# Patient Record
Sex: MALE | Race: BLACK OR AFRICAN AMERICAN | Employment: OTHER | URBAN - METROPOLITAN AREA
[De-identification: names, ages, dates, MRNs, and addresses within clinical notes are randomized per-mention and may not be internally consistent; named-entity substitution may affect disease eponyms.]

---

## 2017-05-04 ENCOUNTER — LAB ENCOUNTER (OUTPATIENT)
Dept: LAB | Facility: HOSPITAL | Age: 82
End: 2017-05-04
Attending: INTERNAL MEDICINE
Payer: COMMERCIAL

## 2017-05-04 DIAGNOSIS — R73.09 OTHER ABNORMAL GLUCOSE: ICD-10-CM

## 2017-05-04 DIAGNOSIS — E78.5 HYPERLIPEMIA: ICD-10-CM

## 2017-05-04 DIAGNOSIS — I10 ESSENTIAL HYPERTENSION, MALIGNANT: Primary | ICD-10-CM

## 2017-05-04 PROCEDURE — 80061 LIPID PANEL: CPT

## 2017-05-04 PROCEDURE — 36415 COLL VENOUS BLD VENIPUNCTURE: CPT

## 2017-05-04 PROCEDURE — 80053 COMPREHEN METABOLIC PANEL: CPT

## 2017-05-04 PROCEDURE — 83036 HEMOGLOBIN GLYCOSYLATED A1C: CPT

## 2017-05-04 PROCEDURE — 84443 ASSAY THYROID STIM HORMONE: CPT

## 2017-05-04 PROCEDURE — 85025 COMPLETE CBC W/AUTO DIFF WBC: CPT

## 2017-05-04 PROCEDURE — 84439 ASSAY OF FREE THYROXINE: CPT

## 2017-05-16 ENCOUNTER — APPOINTMENT (OUTPATIENT)
Dept: WOUND CARE | Facility: HOSPITAL | Age: 82
End: 2017-05-16
Attending: NURSE PRACTITIONER
Payer: MEDICARE

## 2017-05-25 ENCOUNTER — OFFICE VISIT (OUTPATIENT)
Dept: WOUND CARE | Facility: HOSPITAL | Age: 82
End: 2017-05-25
Attending: NURSE PRACTITIONER
Payer: COMMERCIAL

## 2017-05-25 DIAGNOSIS — S81.801A LEG WOUND, RIGHT, INITIAL ENCOUNTER: Primary | ICD-10-CM

## 2017-05-25 PROCEDURE — 99213 OFFICE O/P EST LOW 20 MIN: CPT | Performed by: NURSE PRACTITIONER

## 2017-05-25 PROCEDURE — 29581 APPL MULTLAYER CMPRN SYS LEG: CPT | Performed by: NURSE PRACTITIONER

## 2017-05-25 NOTE — PROGRESS NOTES
Subjective    Chief Complaint  This information was obtained from the patient  10/7/2016: The patient is new to the [address] [facility] here for an initial visit for the evaluation and management of non-healing wound(s). edema bilaterally with a wound on the right carvedilol 25 mg tablet oral tablet oral twice daily  atorvastatin 40 mg tablet oral tablet oral once daily  olmesartan 40 mg tablet oral tablet oral once daily  nifedipine ER 90 mg tablet,extended release oral tablet extended release oral once daily  megan (Encounter Diagnosis) Q80.0 - Ichthyosis vulgaris    Diagnoses    ICD-10  S81.802A: Unspecified open wound, left lower leg, initial encounter  Q80.0: Ichthyosis vulgaris    Patient is 80year old man, treated for the BLE’s wound on Nov 2016.  Patient has se A Multi Layer Compression Wrap procedure was performed for the lower right extremity by Isa Carr RN. A 3 Layers Comprilan was applied with . The procedure was tolerated well with pain level of 0 throughout and a pain level of 0 following the procedure

## 2017-06-01 ENCOUNTER — NURSE ONLY (OUTPATIENT)
Dept: WOUND CARE | Facility: HOSPITAL | Age: 82
End: 2017-06-01
Attending: NURSE PRACTITIONER
Payer: COMMERCIAL

## 2017-06-01 DIAGNOSIS — S81.801D LEG WOUND, RIGHT, SUBSEQUENT ENCOUNTER: Primary | ICD-10-CM

## 2017-06-01 PROCEDURE — 29581 APPL MULTLAYER CMPRN SYS LEG: CPT

## 2017-06-01 NOTE — PROGRESS NOTES
Subjective    Chief Complaint  This information was obtained from the patient  10/7/2016: The patient is new to the [address] [facility] here for an initial visit for the evaluation and management of non-healing wound(s). edema bilaterally with a wound on the right Assessment    Active Problems    ICD-10  (Encounter Diagnosis) Q01.308P - Unspecified open wound, left lower leg, initial encounter  (Encounter Diagnosis) Q80.0 - Ichthyosis vulgaris    Diagnoses    ICD-10  S81.802A: Unspecified open wound, left lower leg, Dressing secured with non-allergenic tape/stockinet/wrap. using Kerlix (1), Silk tape (1)  Applied Compression device.  using Comprilan (1)  Compression wrapping  Stockinette applied using 4\" (1)  Compression applied to: using Toe bases to tibial tubercle

## 2017-06-07 ENCOUNTER — NURSE ONLY (OUTPATIENT)
Dept: WOUND CARE | Facility: HOSPITAL | Age: 82
End: 2017-06-07
Attending: NURSE PRACTITIONER
Payer: COMMERCIAL

## 2017-06-07 DIAGNOSIS — S81.801D LEG WOUND, RIGHT, SUBSEQUENT ENCOUNTER: Primary | ICD-10-CM

## 2017-06-07 PROCEDURE — 29581 APPL MULTLAYER CMPRN SYS LEG: CPT

## 2017-06-07 NOTE — PROGRESS NOTES
Chief Complaint  This information was obtained from the patient  10/7/2016: The patient is new to the [address] [facility],[address] here for an initial visit for the evaluation and management of non-healing wound(s). edema bilaterally with a wound on the right lateral Lower Active Problems    ICD-10  (Encounter Diagnosis) S81.802A - Unspecified open wound, left lower leg, initial encounter  (Encounter Diagnosis) Q80.0 - Ichthyosis vulgaris    Diagnoses    ICD-10  S81.802A: Unspecified open wound, left lower leg, initial encou Applied Primary Wound Dressing. using Xeroform 2x2 (1)   Applied Secondary Wound Dressing.  using ABD (1)   Dressing secured with non-allergenic tape/stockinet/wrap. using Bashir (1), Silk tape (1)   Compression wrapping  Stockinette applied using 4\" (1)

## 2017-06-13 ENCOUNTER — NURSE ONLY (OUTPATIENT)
Dept: WOUND CARE | Facility: HOSPITAL | Age: 82
End: 2017-06-13
Attending: NURSE PRACTITIONER
Payer: COMMERCIAL

## 2017-06-13 DIAGNOSIS — S81.801D LEG WOUND, RIGHT, SUBSEQUENT ENCOUNTER: Primary | ICD-10-CM

## 2017-06-13 PROCEDURE — 29581 APPL MULTLAYER CMPRN SYS LEG: CPT

## 2017-06-13 NOTE — PROGRESS NOTES
Subjective    Chief Complaint  This information was obtained from the patient  (s). edema bilaterally with a wound on the right lateral Lower leg x 2 months.  No complaint for today visit    Allergies  no known allergies    HPI  This information was obtained J99.881K: Unspecified open wound, left lower leg, initial encounter  Q80.0: Ichthyosis vulgaris      Wound with increasing re-epithelialization. BLE skin remains dry and scaly. Ammonium Lactate cream applied as precribed by his PCP.  Compression wrap reappl Compression used: using Artiflex 10 cm (1), Artiflex 15 cm (2), Comprilan 08 cm (1), Comprilan 10 cm (1), Comprilan 12 cm (1)   Notes  ammonium lactate 12% lotion applied ordered by patient's PCP

## 2017-06-22 ENCOUNTER — NURSE ONLY (OUTPATIENT)
Dept: WOUND CARE | Facility: HOSPITAL | Age: 82
End: 2017-06-22
Attending: NURSE PRACTITIONER
Payer: COMMERCIAL

## 2017-06-22 DIAGNOSIS — S81.801D LEG WOUND, RIGHT, SUBSEQUENT ENCOUNTER: Primary | ICD-10-CM

## 2017-06-22 DIAGNOSIS — S81.802D LEG WOUND, LEFT, SUBSEQUENT ENCOUNTER: ICD-10-CM

## 2017-06-22 PROCEDURE — 99211 OFF/OP EST MAY X REQ PHY/QHP: CPT

## 2017-06-22 NOTE — PROGRESS NOTES
Header Image    Progress Note Details  Patient Name: Rodrigo Gautam  Patient Number: [id number]  Patient YOB: 1933  Date: 6/22/2017  RN: Gage Uribe  CNA/CHT/CMA: Almita Lepe  Physician / Amy Flatness: Lesli Farrar: Henrik Frank discharged from clinic. Also instructed on purchasing a second pair of farrow wraps and state they understand and will contact Delmi Dutton with [employer] when ready to purchase. Plan      Follow up on Tuesday in wound clinic and anticipate Discharge.

## 2017-06-23 ENCOUNTER — APPOINTMENT (OUTPATIENT)
Dept: WOUND CARE | Facility: HOSPITAL | Age: 82
End: 2017-06-23
Payer: COMMERCIAL

## 2017-06-27 ENCOUNTER — NURSE ONLY (OUTPATIENT)
Dept: WOUND CARE | Facility: HOSPITAL | Age: 82
End: 2017-06-27
Attending: NURSE PRACTITIONER
Payer: COMMERCIAL

## 2017-06-27 DIAGNOSIS — S81.801D LEG WOUND, RIGHT, SUBSEQUENT ENCOUNTER: Primary | ICD-10-CM

## 2017-06-27 DIAGNOSIS — S81.802D LEG WOUND, LEFT, SUBSEQUENT ENCOUNTER: ICD-10-CM

## 2017-06-27 PROCEDURE — 99211 OFF/OP EST MAY X REQ PHY/QHP: CPT

## 2017-06-27 NOTE — PROGRESS NOTES
Header Image    Progress Note Details  Patient Name: Kinga Valentin  Patient Number: [id number]  Patient YOB: 1933  Date: 6/27/2017  RN: Twila Quach CNA/DARSHAN/CMA: William Smart  Physician / Tete Catalan: Clifton Miner: Mavis Wheeler Anterior Lower Leg)  . Wound Treatment Note  Assessed patient’s pain status and effectiveness of pain management plan. Limb cleansed using Soap and water (1)  Applied topical product to rashawn-wound area avoiding wound base.  using Moisturizing Lotion (1)  Co

## 2017-07-11 ENCOUNTER — OFFICE VISIT (OUTPATIENT)
Dept: INTERNAL MEDICINE CLINIC | Facility: CLINIC | Age: 82
End: 2017-07-11

## 2017-07-11 VITALS
BODY MASS INDEX: 36.05 KG/M2 | SYSTOLIC BLOOD PRESSURE: 120 MMHG | DIASTOLIC BLOOD PRESSURE: 64 MMHG | HEIGHT: 68 IN | WEIGHT: 237.88 LBS | HEART RATE: 73 BPM

## 2017-07-11 DIAGNOSIS — I10 ESSENTIAL HYPERTENSION: ICD-10-CM

## 2017-07-11 DIAGNOSIS — E11.9 TYPE 2 DIABETES MELLITUS WITHOUT COMPLICATION, WITHOUT LONG-TERM CURRENT USE OF INSULIN (HCC): Primary | ICD-10-CM

## 2017-07-11 DIAGNOSIS — E78.00 HYPERCHOLESTEROLEMIA: ICD-10-CM

## 2017-07-11 PROCEDURE — 99203 OFFICE O/P NEW LOW 30 MIN: CPT | Performed by: INTERNAL MEDICINE

## 2017-07-11 PROCEDURE — 99212 OFFICE O/P EST SF 10 MIN: CPT | Performed by: INTERNAL MEDICINE

## 2017-07-11 RX ORDER — OLMESARTAN MEDOXOMIL 40 MG/1
1 TABLET ORAL DAILY
Refills: 4 | COMMUNITY
Start: 2017-04-26

## 2017-07-11 RX ORDER — CARVEDILOL 25 MG/1
1 TABLET ORAL 2 TIMES DAILY
Refills: 3 | COMMUNITY
Start: 2017-06-17

## 2017-07-11 RX ORDER — ATORVASTATIN CALCIUM 40 MG/1
1 TABLET, FILM COATED ORAL DAILY
COMMUNITY
Start: 2017-06-27

## 2017-07-11 RX ORDER — SPIRONOLACTONE 25 MG/1
TABLET ORAL
Refills: 4 | COMMUNITY
Start: 2017-04-26

## 2017-07-11 RX ORDER — NIFEDIPINE 90 MG/1
1 TABLET, EXTENDED RELEASE ORAL DAILY
Refills: 4 | COMMUNITY
Start: 2017-04-26

## 2017-07-11 RX ORDER — FUROSEMIDE 20 MG/1
1 TABLET ORAL DAILY
COMMUNITY
Start: 2017-06-27

## 2017-07-11 RX ORDER — BIOTIN 1 MG
1 TABLET ORAL DAILY
COMMUNITY

## 2017-07-11 NOTE — PATIENT INSTRUCTIONS
Please obtain your previous medical records. Please continue your current medications for now. Please return in 2 months for a recheck.

## 2017-07-11 NOTE — H&P
Rekha Baez is a 80year old male who presents this morning, accompanied by his niece, to establish ongoing primary care. HPI:   Previous PCP was Dr. Floyd Woodson at [facility].  No medical records are currently available.   He recently completed mouth daily. Disp:  Rfl:    aspirin 81 MG Oral Tab Take 81 mg by mouth daily. Disp:  Rfl:    Cholecalciferol (VITAMIN D3) 1000 units Oral Cap Take 1 capsule by mouth daily.  Disp:  Rfl:      No Known Allergies   Past Medical History:   Diagnosis Date   • Es nontender without mass or hepatosplenomegaly  EXTREMITIES: Normal without cyanosis or clubbing  PULSES: 1-2+ bilateral dorsalis pedis and posterior tibial  NEURO: Reflexes 1+ bilaterally and symmetric     ASSESSMENT AND PLAN:   Bethanie Fisher is a 80 year

## (undated) NOTE — MR AVS SNAPSHOT
[address] [facility]  [phone]               Thank you for choosing us for your health care visit with [address].   We are glad to serve you and happy to provide yo [email or web address]

## (undated) NOTE — MR AVS SNAPSHOT
[address] [place] [address]  [phone]               Thank you for choosing us for your health care visit with IDANIA Hale.   We are glad to serve you and happy to provide you wi Tips for making healthy food choices  -   Enjoy your food, but eat less. Fully enjoy your food when eating. Don’t eat while distracted and slow down. Avoid over sized portions. Don’t eat while when you’re bored.      EAT THESE FOODS MORE OFTEN: EAT T

## (undated) NOTE — MR AVS SNAPSHOT
[address] [place] [address]  [phone]               Thank you for choosing us for your health care visit with [address] [place] [address].   We are glad to serve you and happy to provide yo Your unique [employer] Access Code: [facility]  Expires: 7/23/2017  1:35 PM    If you have questions, you can call [phone] to talk to our [facility] Staff. Remember, [employer] is NOT to be used for urgent needs. For medical emergencies, dial 911.

## (undated) NOTE — MR AVS SNAPSHOT
[address]                Thank you for choosing us for your health care visit with [facility] Wound Nurse.   We are glad to serve you and happy to provide you with your Zip Code and Date of Birth to complete the sign-up process. If you do not sign up before the expiration date, you must request a new code.     Your unique [employer] Access Code: [facility]  Expires: 7/23/2017  1:35 PM    If you have questions, you can c

## (undated) NOTE — MR AVS SNAPSHOT
[address] [facility]  [phone]               Thank you for choosing us for your health care visit with [address].   We are glad to serve you and happy to provide yo your Zip Code and Date of Birth to complete the sign-up process. If you do not sign up before the expiration date, you must request a new code.     Your unique [employer] Access Code: [facility]  Expires: 7/23/2017  1:35 PM    If you have questions, you can c